# Patient Record
Sex: FEMALE | Race: WHITE | NOT HISPANIC OR LATINO
[De-identification: names, ages, dates, MRNs, and addresses within clinical notes are randomized per-mention and may not be internally consistent; named-entity substitution may affect disease eponyms.]

---

## 2021-05-07 PROBLEM — Z00.00 ENCOUNTER FOR PREVENTIVE HEALTH EXAMINATION: Status: ACTIVE | Noted: 2021-05-07

## 2021-05-11 ENCOUNTER — APPOINTMENT (OUTPATIENT)
Dept: ENDOCRINOLOGY | Facility: CLINIC | Age: 81
End: 2021-05-11
Payer: MEDICARE

## 2021-05-11 VITALS
SYSTOLIC BLOOD PRESSURE: 140 MMHG | BODY MASS INDEX: 33.13 KG/M2 | DIASTOLIC BLOOD PRESSURE: 80 MMHG | OXYGEN SATURATION: 98 % | HEIGHT: 62 IN | HEART RATE: 88 BPM | WEIGHT: 180 LBS

## 2021-05-11 PROCEDURE — 99204 OFFICE O/P NEW MOD 45 MIN: CPT

## 2021-05-11 NOTE — REASON FOR VISIT
[Initial Evaluation] : an initial evaluation [Hypercalcemia] : hypercalcemia [DM Type 2] : DM Type 2 [Hypothyroidism] : hypothyroidism

## 2021-05-11 NOTE — REVIEW OF SYSTEMS
[Recent Weight Loss (___ Lbs)] : recent weight loss: [unfilled] lbs [Dysphonia] : dysphonia [Dry Skin] : dry skin [Hair Loss] : hair loss [Stress] : stress [Polydipsia] : polydipsia [Cold Intolerance] : cold intolerance [Fatigue] : no fatigue [Blurred Vision] : no blurred vision [Dysphagia] : no dysphagia [Neck Pain] : no neck pain [Chest Pain] : no chest pain [Palpitations] : no palpitations [Shortness Of Breath] : no shortness of breath [Nausea] : no nausea [Constipation] : no constipation [Abdominal Pain] : no abdominal pain [Vomiting] : no vomiting [Diarrhea] : no diarrhea [Polyuria] : no polyuria [Muscle Weakness] : no muscle weakness [Muscle Cramps] : no muscle cramps [Tremors] : no tremors [Pain/Numbness of Digits] : no pain/numbness of digits [Depression] : no depression [Anxiety] : no anxiety [FreeTextEntry5] : racing heart after meals [FreeTextEntry7] : IBS

## 2021-05-11 NOTE — CONSULT LETTER
[Dear  ___] : Dear  [unfilled], [Consult Letter:] : I had the pleasure of evaluating your patient, [unfilled]. [Please see my note below.] : Please see my note below. [Consult Closing:] : Thank you very much for allowing me to participate in the care of this patient.  If you have any questions, please do not hesitate to contact me. [Sincerely,] : Sincerely, [FreeTextEntry3] : Nilda Brooke, DARIALISA-BC

## 2021-05-11 NOTE — HISTORY OF PRESENT ILLNESS
[FreeTextEntry1] : Patient presents today for evaluation of thyroid disorder, referred by PCP Dr Fernández.\par Hypothyroidism diagnosed in 1994 when she saw PCP for evaluation of weight gain.\par Originally started on LT4 88 mcg daily and then weaned down to 75 mcg daily years ago.\par \par Has been noticing more hair loss and dry skin over the past year. \par Feels shaky/anxious at times.\par Earlier this year, had fever and chills, but was COVID negative.\par Has not gained weight despite diet changes. Usually gains 5-7 pounds during the holiday season, but lost 5 pounds this year and is only slowly starting to gain weight back now.\par \par Current regimen:\par LT4 75 mcg daily\par Takes appropriately.\par \par Never had thyroid sonogram.\par Denies anterior neck pain. Occasional soreness secondary to post nasal drip.\par Denies dysphagia. \par Some voice hoarseness she contributes to GERD; on protonix for this. \par Denies exposure to radiation of the neck.\par ============================================\par Hypercalcemia\par No family history of high calcium\par No history of osteoporosis. Has been told she has osteopenia in the past.\par No h/o fractures.\par No use of calcium supplements.\par No Tums.\par DIetary calcium: minimal. No milk, no yogurt, no cheese  \par No history of kidney stones.\par =========================================================\par Diabetes\par Type: 2\par Severity: mild\par Duration: April 2021\par Onset: prediabetes progressed to type 2 diabetes\par Associated symptoms: none\par \par Current regimen: no medications.\par \par Modifying factors:\par Diet: has done low carb diet for years, but was eating more bread and pasta this year.\par Weight: lost 5 pounds early this year and now slowly gaining back\par Exercise: none other than house work\par ===========================================================================\par PMH: HTN, hyperlipideima, GERD, IBS. Denies GDM.\par PSH: tubal ligation, cholecystectomy, tonsillectomy\par FH: 3 sisters, 1 daughter, and father all with hypothyroidism. 1 sister with diabetes. Other daughter with thyroid nodules. 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 No recreational drug use.\par \par \par \par \par \par A1c 6.6%.

## 2021-05-11 NOTE — DATA REVIEWED
[FreeTextEntry1] : Labs April 2021\par TSH 5.39\par Free T4 1.4\par Calcium 12.5 \par \par A1c 6.6%\par Cr 1.35\par GFR 37\par \par Labs Feb 2021\par Calcium 11.3\par Cr 1.12\par GFR 47\par \par Labs Dec 2020\par Calcium 10.8\par TSH 6.76\par A1c 6.4%\par Cr 1.11\par GFR 60\par \par Labs June 202\par Calcium 10.6\par A1c 6.0%\par Cr 1.16\par GFR 45\par

## 2021-05-11 NOTE — ASSESSMENT
[Importance of Diet and Exercise] : importance of diet and exercise to improve glycemic control, achieve weight loss and improve cardiovascular health [Levothyroxine] : The patient was instructed to take Levothyroxine on an empty stomach, separate from vitamins, and wait at least 30 minutes before eating [FreeTextEntry1] : 80 year old female with hypothyroidism, hypercalcemia/hyperparathyroidism, and newly diagnosed type 2 diabetes. \par \par 1. Hypothyroidism- mild TSH elevation April 2021, but improving.\par -Continue current dose of LT4.\par -Given age, hypercalcemia, and history of osteopenia, would avoid increasing dose at this time.\par -Repeat TFTs now with Tg ab and TPO ab to see if trend has continued to improve.\par \par 2. Hypercalcemia/hyperparathyroidism\par -Repeat calcium and PTH now with vitamin D level, ionized calcium, and phos.\par -Check thyroid/parathyroid sonogram.\par -Will likely need 24 hour urine calcium and Sestamibi scan.\par -Check DEXA scan.\par \par 3. T2DM- A1c 6.6%.\par -Discussed importance of lifestyle modifications- diet, exercise, and weight loss- to improve glycemic control.\par -Advised follow-up with CDE to review diabetic diet.\par -Repeat A1c in 3 months.\par \par 4. Declining kidney function\par -Repeat Cr and GFR now.\par -Will likely need nephrology referral.\par

## 2021-05-11 NOTE — PHYSICAL EXAM
[Alert] : alert [Well Nourished] : well nourished [No Acute Distress] : no acute distress [Well Developed] : well developed [Normal Sclera/Conjunctiva] : normal sclera/conjunctiva [EOMI] : extra ocular movement intact [No Proptosis] : no proptosis [Thyroid Not Enlarged] : the thyroid was not enlarged [No Thyroid Nodules] : no palpable thyroid nodules [No Respiratory Distress] : no respiratory distress [No Accessory Muscle Use] : no accessory muscle use [Clear to Auscultation] : lungs were clear to auscultation bilaterally [Normal S1, S2] : normal S1 and S2 [Normal Rate] : heart rate was normal [Regular Rhythm] : with a regular rhythm [No Edema] : no peripheral edema [Normal Anterior Cervical Nodes] : no anterior cervical lymphadenopathy [Normal Posterior Cervical Nodes] : no posterior cervical lymphadenopathy [Normal Gait] : normal gait [No Tremors] : no tremors [Oriented x3] : oriented to person, place, and time [Normal Affect] : the affect was normal [Normal Mood] : the mood was normal [Acanthosis Nigricans] : no acanthosis nigricans

## 2021-05-19 ENCOUNTER — APPOINTMENT (OUTPATIENT)
Dept: ENDOCRINOLOGY | Facility: CLINIC | Age: 81
End: 2021-05-19
Payer: MEDICARE

## 2021-05-19 LAB — HBA1C MFR BLD HPLC: 6.6

## 2021-05-19 PROCEDURE — G0108 DIAB MANAGE TRN  PER INDIV: CPT

## 2021-06-15 ENCOUNTER — RX RENEWAL (OUTPATIENT)
Age: 81
End: 2021-06-15

## 2021-06-22 ENCOUNTER — APPOINTMENT (OUTPATIENT)
Dept: ENDOCRINOLOGY | Facility: CLINIC | Age: 81
End: 2021-06-22

## 2021-07-02 ENCOUNTER — APPOINTMENT (OUTPATIENT)
Dept: ENDOCRINOLOGY | Facility: CLINIC | Age: 81
End: 2021-07-02
Payer: MEDICARE

## 2021-07-02 VITALS
BODY MASS INDEX: 32.39 KG/M2 | SYSTOLIC BLOOD PRESSURE: 140 MMHG | HEART RATE: 81 BPM | WEIGHT: 176 LBS | OXYGEN SATURATION: 98 % | DIASTOLIC BLOOD PRESSURE: 80 MMHG | HEIGHT: 62 IN

## 2021-07-02 LAB — GLUCOSE BLDC GLUCOMTR-MCNC: 124

## 2021-07-02 PROCEDURE — 99214 OFFICE O/P EST MOD 30 MIN: CPT | Mod: 25

## 2021-07-02 PROCEDURE — 82962 GLUCOSE BLOOD TEST: CPT

## 2021-07-02 NOTE — HISTORY OF PRESENT ILLNESS
[FreeTextEntry1] : Patient presents today for follow-up of hypothyroidism, hypercalcemia/hyperparathyroidism, and type 2 diabetes.\par \par Hypothyroidism diagnosed in 1994 when she saw PCP for evaluation of weight gain.\par Originally started on LT4 88 mcg daily and then weaned down to 75 mcg daily years ago.\par Increased back to 88 mcg in May 2021 for TSH 6.34 and hair loss and dry skin have been improving.\par Feeling less shaky/anxious than in the past\par \par Current regimen:\par LT4 88 mcg daily\par Takes appropriately.\par \par Thyroid sonogram May 2021: right 0.7 x 0.4 x 0.5 cm cyst\par \par Denies anterior neck pain. Occasional soreness secondary to post nasal drip.\par Denies dysphagia. \par Some voice hoarseness she attributes to GERD; on protonix for this. \par Denies exposure to radiation of the neck.\par ============================================\par Hypercalcemia\par No family history of high calcium\par No history of osteoporosis. Has been told she has osteopenia in the past.\par No h/o fractures.\par No use of calcium supplements.\par No Tums.\par DIetary calcium: minimal. No milk, no yogurt, no cheese \par No history of kidney stones.\par \par Labs:\par June 2020: Calcium 10.6, Cr 1.16, GFR 45\par December 2020: Calcium 10.8, Cr 1.11\par February 2021: Calcium 11.3, Cr 1.12, GFR 47\par April 2021: Calcium 12.5, , GFR 37, Cr 1.35\par May 2021: Calcium 10.9, , ionized calcium 6.0, Cr 1.05, GFR 51, vitamin D 18, Calcitriol 83.8, 24 hr urine 178\par \par Imaging:\par DEXA May 2021: osteopenia, FRAX 4.5% at the hip and 15.4% for major osteoporotic fracture. -12% change in bone density at the hip compared to prior imaging in 2016.\par Sestamibi scan June 2021: left sided sestamibi avid nodule most likely representing a parathyroid adenoma.\par =========================================================\par Diabetes\par Type: 2\par Severity: mild\par Duration: April 2021\par Onset: prediabetes progressed to type 2 diabetes\par Associated symptoms: none\par \par Current regimen: no medications.\par Current , ate lunch one hour ago (turkey, swiss cheese on a low carb wrap)\par \par Modifying factors:\par Diet: has done low carb diet in the past. During pandemic was baking and not eating as healthy. Since seeing CDE has been avoiding bread, cut back on what she was normally eating and now eating low carb again.\par Weight: lost 4 pounds in the past 8 weeks\par Exercise: none other than house work\par \par Eye exam: February 2021, denies DR per patient.\par Foot exam: denies numbness/tingling. Saw podiatry August 2020.\par Heart disease: denies MI/CVA, no stents\par Kidney disease: decreased GFR may be due to hypercalcemia, improving.\par ===========================================================================\par PMH: HTN, hyperlipideima, GERD, IBS. Denies GDM.\par PSH: tubal ligation, cholecystectomy, tonsillectomy\par FH: 3 sisters, 1 daughter, and father all with hypothyroidism. 1 sister with diabetes. Other daughter with thyroid nodules. 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 No recreational drug use.

## 2021-07-02 NOTE — REVIEW OF SYSTEMS
[Recent Weight Loss (___ Lbs)] : recent weight loss: [unfilled] lbs [Heartburn] : heartburn [Muscle Cramps] : muscle cramps [Fatigue] : no fatigue [Blurred Vision] : no blurred vision [Dysphagia] : no dysphagia [Neck Pain] : no neck pain [Dysphonia] : no dysphonia [Chest Pain] : no chest pain [Palpitations] : no palpitations [Shortness Of Breath] : no shortness of breath [Nausea] : no nausea [Abdominal Pain] : no abdominal pain [Constipation] : no constipation [Vomiting] : no vomiting [Diarrhea] : no diarrhea [Polyuria] : no polyuria [Dry Skin] : no dry skin [Hair Loss] : no hair loss [Tremors] : no tremors [Depression] : no depression [Anxiety] : no anxiety [Polydipsia] : no polydipsia

## 2021-07-02 NOTE — PHYSICAL EXAM
[Alert] : alert [Well Nourished] : well nourished [No Acute Distress] : no acute distress [Well Developed] : well developed [Normal Sclera/Conjunctiva] : normal sclera/conjunctiva [EOMI] : extra ocular movement intact [No Proptosis] : no proptosis [Thyroid Not Enlarged] : the thyroid was not enlarged [No Thyroid Nodules] : no palpable thyroid nodules [No Respiratory Distress] : no respiratory distress [No Accessory Muscle Use] : no accessory muscle use [Clear to Auscultation] : lungs were clear to auscultation bilaterally [Normal S1, S2] : normal S1 and S2 [Normal Rate] : heart rate was normal [Regular Rhythm] : with a regular rhythm [No Edema] : no peripheral edema [Normal Anterior Cervical Nodes] : no anterior cervical lymphadenopathy [Normal Gait] : normal gait [No Tremors] : no tremors [Oriented x3] : oriented to person, place, and time [Normal Affect] : the affect was normal [Normal Mood] : the mood was normal [Acanthosis Nigricans] : no acanthosis nigricans

## 2021-07-02 NOTE — ASSESSMENT
[FreeTextEntry1] : 80 year old female with hypothyroidism, hypercalcemia/hyperparathyroidism, and newly diagnosed type 2 diabetes. \par \par 1. Hypothyroidism- euthyroid on replacement T4.\par -Continue current dose of LT4.\par -Repeat TFTs with next labs.\par \par 2. Hypercalcemia due to primary hyperparathyroidism\par -Given degree of hypercalcemia in the past, decreased kidney function, and patient's good state of health, recommend evaluation by ENT surgery.\par -Reviewed risks of hyperparathyroidism including bone loss, fracture, kidney stones, etc.\par \par 3. T2DM- A1c 6.6%.\par -Discussed importance of lifestyle modifications- diet, exercise, and weight loss- to improve glycemic control.\par -Repeat A1c in 3 months.\par \par

## 2021-07-02 NOTE — REASON FOR VISIT
[Follow - Up] : a follow-up visit [Hypercalcemia] : hypercalcemia [DM Type 2] : DM Type 2 [Hypothyroidism] : hypothyroidism

## 2021-11-11 LAB
HBA1C MFR BLD HPLC: 6
LDLC SERPL DIRECT ASSAY-MCNC: 89

## 2021-11-12 ENCOUNTER — APPOINTMENT (OUTPATIENT)
Dept: ENDOCRINOLOGY | Facility: CLINIC | Age: 81
End: 2021-11-12
Payer: MEDICARE

## 2021-11-12 VITALS
WEIGHT: 170 LBS | OXYGEN SATURATION: 98 % | BODY MASS INDEX: 31.28 KG/M2 | DIASTOLIC BLOOD PRESSURE: 76 MMHG | HEIGHT: 62 IN | SYSTOLIC BLOOD PRESSURE: 124 MMHG | HEART RATE: 92 BPM

## 2021-11-12 DIAGNOSIS — E83.52 HYPERCALCEMIA: ICD-10-CM

## 2021-11-12 DIAGNOSIS — E55.9 VITAMIN D DEFICIENCY, UNSPECIFIED: ICD-10-CM

## 2021-11-12 LAB — GLUCOSE BLDC GLUCOMTR-MCNC: 108

## 2021-11-12 PROCEDURE — 99214 OFFICE O/P EST MOD 30 MIN: CPT | Mod: 25

## 2021-11-12 PROCEDURE — 82962 GLUCOSE BLOOD TEST: CPT

## 2021-11-12 RX ORDER — MULTIVIT-MIN/FOLIC/VIT K/LYCOP 400-300MCG
25 MCG TABLET ORAL DAILY
Qty: 90 | Refills: 0 | Status: DISCONTINUED | COMMUNITY
Start: 2021-05-18 | End: 2021-11-12

## 2021-11-12 RX ORDER — PANTOPRAZOLE SODIUM 40 MG/1
40 TABLET, DELAYED RELEASE ORAL
Refills: 0 | Status: ACTIVE | COMMUNITY

## 2021-11-12 RX ORDER — LOVASTATIN 40 MG/1
40 TABLET ORAL
Refills: 0 | Status: ACTIVE | COMMUNITY

## 2021-11-12 RX ORDER — AMLODIPINE BESYLATE 5 MG/1
5 TABLET ORAL
Refills: 0 | Status: ACTIVE | COMMUNITY

## 2021-11-12 NOTE — REVIEW OF SYSTEMS
[Recent Weight Loss (___ Lbs)] : recent weight loss: [unfilled] lbs [Nocturia] : nocturia [Blurred Vision] : no blurred vision [Dysphagia] : no dysphagia [Neck Pain] : no neck pain [Chest Pain] : no chest pain [Palpitations] : no palpitations [Shortness Of Breath] : no shortness of breath [Nausea] : no nausea [Constipation] : no constipation [Abdominal Pain] : no abdominal pain [Vomiting] : no vomiting [Diarrhea] : no diarrhea [Tremors] : no tremors [Pain/Numbness of Digits] : no pain/numbness of digits

## 2021-11-12 NOTE — ASSESSMENT
[FreeTextEntry1] : 81 year old female with hypothyroidism, hypercalcemia/hyperparathyroidism, and newly diagnosed type 2 diabetes. \par \par 1. Hypothyroidism- euthyroid on replacement T4.\par -Continue current dose of LT4.\par -Repeat TFTs in 3 months.\par \par 2. Hypercalcemia due to primary hyperparathyroidism\par -Given degree of hypercalcemia in the past, decreased kidney function, and patient's good state of health, agree with plan for surgery.\par -Reviewed risks of hyperparathyroidism including bone loss, fracture, kidney stones, etc.\par -Repeat calcium and PTH after surgery.\par \par 3. T2DM- A1c 6.0%.\par -Discussed importance of lifestyle modifications- diet, exercise, and weight loss- to maintain glycemic control.\par -Repeat A1c in 3 months.\par \par 4. Vitamin D deficiency\par -Can hold off on supplement for now given mild deficiency and elevated calcium.

## 2021-11-12 NOTE — REASON FOR VISIT
[Follow - Up] : a follow-up visit [Hypercalcemia] : hypercalcemia [DM Type 2] : DM Type 2 [Hypothyroidism] : hypothyroidism [Hyperparathyroidism] : hyperparathyroidism

## 2021-11-12 NOTE — HISTORY OF PRESENT ILLNESS
[FreeTextEntry1] : Had UTI last month. Presents today for follow-up of hypothyroidism, hypercalcemia/hyperparathyroidism, and type 2 diabetes.\par \par Hypothyroidism diagnosed in 1994 when she saw PCP for evaluation of weight gain.\par Originally started on LT4 88 mcg daily and then weaned down to 75 mcg daily years ago.\par Increased back to 88 mcg in May 2021 for TSH 6.34 and hair loss and dry skin have been improving.\par Feeling less shaky/anxious than in the past\par \par Current regimen:\par LT4 88 mcg daily\par Takes appropriately.\par \par Thyroid sonogram May 2021: right 0.7 x 0.4 x 0.5 cm cyst\par \par Denies anterior neck pain.\par Denies dysphagia. \par Some voice hoarseness she attributes to GERD; on Protonix for this. \par Denies exposure to radiation of the neck.\par ============================================\par Hypercalcemia\par No family history of high calcium\par No history of osteoporosis. Has been told she has osteopenia in the past.\par No h/o fractures.\par No use of calcium supplements.\par No Tums.\par DIetary calcium: minimal. No milk, no yogurt, no cheese \par No history of kidney stones.\par \par Labs:\par June 2020: Calcium 10.6, Cr 1.16, GFR 45\par December 2020: Calcium 10.8, Cr 1.11\par February 2021: Calcium 11.3, Cr 1.12, GFR 47\par April 2021: Calcium 12.5, , GFR 37, Cr 1.35\par May 2021: Calcium 10.9, , ionized calcium 6.0, Cr 1.05, GFR 51, vitamin D 18, Calcitriol 83.8, 24 hr urine 178\par October 2021: Calcium 11.2, .2, Cr 1.05, GFR 50, vitamin D 26.6 \par \par Imaging:\par DEXA May 2021: osteopenia, FRAX 4.5% at the hip and 15.4% for major osteoporotic fracture. -12% change in bone density at the hip compared to prior imaging in 2016.\par Sestamibi scan June 2021: left sided sestamibi avid nodule most likely representing a parathyroid adenoma.\par \par Saw ENT, Dr Lipscomb, and is planning on surgery after the holidays.\par =========================================================\par Diabetes\par Type: 2\par Severity: mild\par Duration: April 2021\par Onset: prediabetes progressed to type 2 diabetes\par Associated symptoms: none\par \par Current regimen: no medications.\par Current , ate turkey, swiss cheese, and tomato on high fiber bread 1 hour ago\par \par Modifying factors:\par Diet: has done low carb diet in the past. During pandemic was baking and not eating as healthy. Since seeing CDE has been avoiding bread, cut back on what she was normally eating and now eating low carb again.\par Weight: lost 6 pounds in 3 months\par Exercise: none other than house work\par \par Eye exam: September 2021, denies DR per patient.\par Foot exam: denies numbness/tingling. Saw podiatry one year ago.\par Heart disease: denies MI/CVA, no stents\par Kidney disease: decreased GFR may be due to hypercalcemia, improving.\par ===========================================================================\par PMH: HTN, hyperlipidemia, GERD, IBS. Denies GDM.\par PSH: tubal ligation, cholecystectomy, tonsillectomy\par FH: 3 sisters, 1 daughter, and father all with hypothyroidism. 1 sister with diabetes. Other daughter with thyroid nodules. 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 No recreational drug use. \par

## 2022-03-28 LAB
HBA1C MFR BLD HPLC: 6.3
LDLC SERPL DIRECT ASSAY-MCNC: 113

## 2022-03-29 ENCOUNTER — APPOINTMENT (OUTPATIENT)
Dept: ENDOCRINOLOGY | Facility: CLINIC | Age: 82
End: 2022-03-29
Payer: MEDICARE

## 2022-03-29 VITALS
WEIGHT: 172 LBS | BODY MASS INDEX: 31.65 KG/M2 | DIASTOLIC BLOOD PRESSURE: 80 MMHG | HEART RATE: 83 BPM | HEIGHT: 62 IN | SYSTOLIC BLOOD PRESSURE: 130 MMHG | OXYGEN SATURATION: 97 %

## 2022-03-29 DIAGNOSIS — R73.03 PREDIABETES.: ICD-10-CM

## 2022-03-29 DIAGNOSIS — Z78.9 OTHER SPECIFIED HEALTH STATUS: ICD-10-CM

## 2022-03-29 DIAGNOSIS — E11.9 TYPE 2 DIABETES MELLITUS W/OUT COMPLICATIONS: ICD-10-CM

## 2022-03-29 DIAGNOSIS — E21.3 HYPERPARATHYROIDISM, UNSPECIFIED: ICD-10-CM

## 2022-03-29 DIAGNOSIS — E03.9 HYPOTHYROIDISM, UNSPECIFIED: ICD-10-CM

## 2022-03-29 DIAGNOSIS — Z83.49 FAMILY HISTORY OF OTHER ENDOCRINE, NUTRITIONAL AND METABOLIC DISEASES: ICD-10-CM

## 2022-03-29 LAB — GLUCOSE BLDC GLUCOMTR-MCNC: 98

## 2022-03-29 PROCEDURE — 99214 OFFICE O/P EST MOD 30 MIN: CPT | Mod: 25

## 2022-03-29 PROCEDURE — 82962 GLUCOSE BLOOD TEST: CPT

## 2022-03-29 NOTE — PHYSICAL EXAM
[Healthy Appearance] : healthy appearance [No Acute Distress] : no acute distress [No Neck Mass] : no neck mass was observed [No LAD] : no lymphadenopathy [Supple] : the neck was supple [Thyroid Not Enlarged] : the thyroid was not enlarged [No Thyroid Nodules] : no palpable thyroid nodules [No Respiratory Distress] : no respiratory distress [Clear to Auscultation] : lungs were clear to auscultation bilaterally [Normal S1, S2] : normal S1 and S2 [No Murmurs] : no murmurs [Normal Rate] : heart rate was normal [Regular Rhythm] : with a regular rhythm [Normal Gait] : normal gait [No Clubbing, Cyanosis] : no clubbing  or cyanosis of the fingernails [Normal Affect] : the affect was normal [Normal Insight/Judgement] : insight and judgment were intact [Normal Mood] : the mood was normal [No Spinal Tenderness] : no spinal tenderness [Spine Straight] : spine straight [Acanthosis Nigricans] : no acanthosis nigricans

## 2022-03-29 NOTE — DATA REVIEWED
[FreeTextEntry1] : LABS:\par 2/8/2022:\par creatinine 1.13\par Calcium 10.8\par PTH 98\par 25(OH)D 21\par \par A1c 6.3\par \par DXA\par 5/26/2021:\par Distal forearm  T score -1.2\par L spine T score  -1.6\par Left femoral neck T score -1.8

## 2022-03-29 NOTE — ASSESSMENT
[FreeTextEntry1] : 81 year old female with PMH of mild Type 2 DM, currently well controlled with diet modification, hypothyroidism HTN, HLD and PHPT.  Her hypercalcemia is stable. \par \par 1.  PHPT-  explained that she meets criteria for parathyroidectomy due to her reduced renal function and her level of hypercalcemia > 11.5 within the past year.   Discussed complications of untreated PHPT including bone loss, further decline in renal function and nephrolithiasis.  Ok to postpone surgery until summer 2022 as her hypercalcemia is stable at the current time.   After surgery, will need calcium and vitamin D supplementation due to associated low bone mass.\par 2.  Hypothyroidism-  continue current dose of LT4.\par 3.  Type 2 DM-  continue lifestyle modification.\par \par Will arrange for follow up in September 2022 as she is planning on having parathyroidectomy in summer 2022.

## 2022-03-29 NOTE — REVIEW OF SYSTEMS
[Fatigue] : no fatigue [Constipation] : no constipation [Polyuria] : no polyuria [Polydipsia] : no polydipsia

## 2022-03-29 NOTE — HISTORY OF PRESENT ILLNESS
[FreeTextEntry1] : Here for follow up of primary hyperparathyroidism, hypothyroidism and mild type 2 DM.\par \par History of moderate hypercalcemia since 2020 with level as high as 12.5 in 2021.  Labs confirmed PHPT with elevated PTH and urine calcium of 178.  DXA showed associated osteopenia and mildly reduced GFR.    Sestamibi shows uptake in left upper thyroid bed.  Thyroid US only revealed 0.7 cm cyst in the right lobe.  Following with Dr. Lipscomb, but patient wants to postpone surgery until summer 2022.  \par \par Long standing hypothyroidism since 1994.  Currently takes LT4 88 mcg daily. \par \par Mild T2DM was diagnosed on routine labs in April 2021, improved with diet modification.  \par \par Denies any associated fatigue or polyuria.

## 2022-09-20 ENCOUNTER — APPOINTMENT (OUTPATIENT)
Dept: ENDOCRINOLOGY | Facility: CLINIC | Age: 82
End: 2022-09-20

## 2023-06-28 ENCOUNTER — RX RENEWAL (OUTPATIENT)
Age: 83
End: 2023-06-28

## 2023-11-17 ENCOUNTER — OFFICE (OUTPATIENT)
Dept: URBAN - METROPOLITAN AREA CLINIC 12 | Facility: CLINIC | Age: 83
Setting detail: OPHTHALMOLOGY
End: 2023-11-17
Payer: MEDICARE

## 2023-11-17 DIAGNOSIS — H40.013: ICD-10-CM

## 2023-11-17 DIAGNOSIS — H43.813: ICD-10-CM

## 2023-11-17 DIAGNOSIS — E11.9: ICD-10-CM

## 2023-11-17 DIAGNOSIS — H25.13: ICD-10-CM

## 2023-11-17 PROCEDURE — 92250 FUNDUS PHOTOGRAPHY W/I&R: CPT | Performed by: OPHTHALMOLOGY

## 2023-11-17 PROCEDURE — 99204 OFFICE O/P NEW MOD 45 MIN: CPT | Performed by: OPHTHALMOLOGY

## 2023-11-17 ASSESSMENT — REFRACTION_AUTOREFRACTION
OD_AXIS: 177
OD_CYLINDER: -0.75
OD_SPHERE: +2.25
OS_CYLINDER: -0.50
OS_AXIS: 005
OS_SPHERE: +3.00

## 2023-11-17 ASSESSMENT — REFRACTION_CURRENTRX
OS_SPHERE: +2.50
OD_OVR_VA: 20/
OD_ADD: +2.75
OS_VPRISM_DIRECTION: SV
OD_VPRISM_DIRECTION: SV
OS_CYLINDER: SPHERE
OS_SPHERE: +2.00
OD_CYLINDER: SPHERE
OD_VPRISM_DIRECTION: BF
OS_CYLINDER: -0.75
OS_OVR_VA: 20/
OD_AXIS: 007
OD_CYLINDER: -0.75
OD_SPHERE: +2.25
OD_OVR_VA: 20/
OS_AXIS: 023
OS_VPRISM_DIRECTION: BF
OD_SPHERE: +2.00
OS_ADD: +2.75
OS_OVR_VA: 20/

## 2023-11-17 ASSESSMENT — REFRACTION_MANIFEST
OD_SPHERE: +2.25
OS_SPHERE: +3.00
OD_VA1: 20/25+2
OD_AXIS: 175
OD_CYLINDER: -0.75
OS_CYLINDER: -0.50
OS_AXIS: 005
OS_VA1: 20/30

## 2023-11-17 ASSESSMENT — SPHEQUIV_DERIVED
OS_SPHEQUIV: 2.75
OS_SPHEQUIV: 2.75
OD_SPHEQUIV: 1.875
OD_SPHEQUIV: 1.875

## 2023-11-17 ASSESSMENT — CONFRONTATIONAL VISUAL FIELD TEST (CVF)
OS_FINDINGS: FULL
OD_FINDINGS: FULL

## 2024-06-11 ASSESSMENT — KERATOMETRY
OS_K2POWER_DIOPTERS: 43.75
OD_AXISANGLE_DEGREES: 085
OD_K1POWER_DIOPTERS: 43.25
OS_AXISANGLE_DEGREES: 095
OD_K2POWER_DIOPTERS: 44.25
OS_K1POWER_DIOPTERS: 43.00

## 2024-06-11 ASSESSMENT — REFRACTION_CURRENTRX
OD_SPHERE: +2.00
OS_VPRISM_DIRECTION: BF
OS_ADD: +2.75
OS_CYLINDER: SPHERE
OS_OVR_VA: 20/
OD_CYLINDER: -0.75
OS_CYLINDER: -0.75
OS_SPHERE: +2.00
OD_VPRISM_DIRECTION: BF
OS_OVR_VA: 20/
OS_VPRISM_DIRECTION: SV
OS_SPHERE: +2.50
OD_ADD: +2.75
OD_SPHERE: +2.25
OD_AXIS: 007
OD_VPRISM_DIRECTION: SV
OD_OVR_VA: 20/
OD_OVR_VA: 20/
OD_CYLINDER: SPHERE
OS_AXIS: 023

## 2024-06-11 ASSESSMENT — REFRACTION_MANIFEST
OS_SPHERE: +3.00
OD_AXIS: 175
OS_AXIS: 005
OS_CYLINDER: -0.50
OS_VA1: 20/30
OD_CYLINDER: -0.75
OD_VA1: 20/25+2
OD_SPHERE: +2.25

## 2024-06-19 ENCOUNTER — RX RENEWAL (OUTPATIENT)
Age: 84
End: 2024-06-19

## 2024-06-19 RX ORDER — LEVOTHYROXINE SODIUM 0.09 MG/1
88 TABLET ORAL
Qty: 90 | Refills: 0 | Status: ACTIVE | COMMUNITY
Start: 2021-05-18 | End: 1900-01-01

## 2024-07-10 ENCOUNTER — OFFICE (OUTPATIENT)
Dept: URBAN - METROPOLITAN AREA CLINIC 105 | Facility: CLINIC | Age: 84
Setting detail: OPHTHALMOLOGY
End: 2024-07-10
Payer: MEDICARE

## 2024-07-10 ENCOUNTER — RX ONLY (RX ONLY)
Age: 84
End: 2024-07-10

## 2024-07-10 DIAGNOSIS — H25.12: ICD-10-CM

## 2024-07-10 DIAGNOSIS — H25.13: ICD-10-CM

## 2024-07-10 PROBLEM — H25.11 CATARACT; RIGHT EYE, LEFT EYE, BOTH EYES: Status: ACTIVE | Noted: 2024-07-10

## 2024-07-10 PROBLEM — H52.7 REFRACTIVE ERROR: Status: ACTIVE | Noted: 2024-07-10

## 2024-07-10 PROCEDURE — 92136 OPHTHALMIC BIOMETRY: CPT | Mod: 26,LT | Performed by: OPHTHALMOLOGY

## 2024-07-10 PROCEDURE — 92136 OPHTHALMIC BIOMETRY: CPT | Mod: TC | Performed by: OPHTHALMOLOGY

## 2024-07-10 PROCEDURE — 99213 OFFICE O/P EST LOW 20 MIN: CPT | Performed by: OPHTHALMOLOGY

## 2024-07-10 ASSESSMENT — CONFRONTATIONAL VISUAL FIELD TEST (CVF)
OS_FINDINGS: FULL
OD_FINDINGS: FULL

## 2024-09-05 ENCOUNTER — AMBULATORY SURGERY CENTER (OUTPATIENT)
Dept: URBAN - METROPOLITAN AREA SURGERY 4 | Facility: SURGERY | Age: 84
Setting detail: OPHTHALMOLOGY
End: 2024-09-05
Payer: MEDICARE

## 2024-09-05 ENCOUNTER — RX ONLY (RX ONLY)
Age: 84
End: 2024-09-05

## 2024-09-05 DIAGNOSIS — H25.12: ICD-10-CM

## 2024-09-05 DIAGNOSIS — H52.4: ICD-10-CM

## 2024-09-05 PROCEDURE — 66984 XCAPSL CTRC RMVL W/O ECP: CPT | Mod: 54,LT | Performed by: OPHTHALMOLOGY

## 2024-09-05 PROCEDURE — V2788P PANOPTIX: Performed by: OPHTHALMOLOGY

## 2024-09-06 ENCOUNTER — OFFICE (OUTPATIENT)
Dept: URBAN - METROPOLITAN AREA CLINIC 105 | Facility: CLINIC | Age: 84
Setting detail: OPHTHALMOLOGY
End: 2024-09-06
Payer: MEDICARE

## 2024-09-06 DIAGNOSIS — H25.11: ICD-10-CM

## 2024-09-06 PROCEDURE — 92136 OPHTHALMIC BIOMETRY: CPT | Mod: 26,RT | Performed by: OPHTHALMOLOGY

## 2024-09-06 ASSESSMENT — CONFRONTATIONAL VISUAL FIELD TEST (CVF)
OD_FINDINGS: FULL
OS_FINDINGS: FULL

## 2024-09-23 ENCOUNTER — AMBULATORY SURGERY CENTER (OUTPATIENT)
Dept: URBAN - METROPOLITAN AREA SURGERY 4 | Facility: SURGERY | Age: 84
Setting detail: OPHTHALMOLOGY
End: 2024-09-23
Payer: MEDICARE

## 2024-09-23 DIAGNOSIS — H52.211: ICD-10-CM

## 2024-09-23 DIAGNOSIS — H25.11: ICD-10-CM

## 2024-09-23 PROCEDURE — V2788P PANOPTIX: Performed by: OPHTHALMOLOGY

## 2024-09-23 PROCEDURE — 66984 XCAPSL CTRC RMVL W/O ECP: CPT | Mod: 54,79,RT | Performed by: OPHTHALMOLOGY

## 2024-09-24 ENCOUNTER — RX ONLY (RX ONLY)
Age: 84
End: 2024-09-24

## 2024-09-24 ENCOUNTER — OFFICE (OUTPATIENT)
Dept: URBAN - METROPOLITAN AREA CLINIC 103 | Facility: CLINIC | Age: 84
Setting detail: OPHTHALMOLOGY
End: 2024-09-24
Payer: MEDICARE

## 2024-09-24 DIAGNOSIS — Z96.1: ICD-10-CM

## 2024-09-24 PROBLEM — H25.11 CATARACT NUCLEAR SCLEROSIS AGE RELATED; RIGHT EYE: Status: RESOLVED | Noted: 2024-09-06 | Resolved: 2024-09-24

## 2024-09-24 PROCEDURE — 99024 POSTOP FOLLOW-UP VISIT: CPT | Performed by: OPHTHALMOLOGY

## 2024-09-24 ASSESSMENT — CONFRONTATIONAL VISUAL FIELD TEST (CVF)
OS_FINDINGS: FULL
OD_FINDINGS: FULL

## 2025-04-14 NOTE — REASON FOR VISIT
No [Follow - Up] : a follow-up visit [DM Type 2] : DM Type 2 [Hypothyroidism] : hypothyroidism [Hyperparathyroidism] : hyperparathyroidism

## 2025-08-04 ENCOUNTER — OFFICE (OUTPATIENT)
Dept: URBAN - METROPOLITAN AREA CLINIC 105 | Facility: CLINIC | Age: 85
Setting detail: OPHTHALMOLOGY
End: 2025-08-04
Payer: MEDICARE

## 2025-08-04 DIAGNOSIS — H26.493: ICD-10-CM

## 2025-08-04 PROCEDURE — 99213 OFFICE O/P EST LOW 20 MIN: CPT | Performed by: OPHTHALMOLOGY

## 2025-08-04 ASSESSMENT — REFRACTION_MANIFEST
OD_CYLINDER: -0.50
OS_VA3: 20/30
OD_VA1: 20/40+
OS_VA1: 20/40+
OS_AXIS: 150
OS_SPHERE: +2.75
OD_VA3: 20/50
OD_SPHERE: +2.25
OS_CYLINDER: -0.50
OD_AXIS: 170

## 2025-08-04 ASSESSMENT — KERATOMETRY
OS_K2POWER_DIOPTERS: 43.75
OD_K2POWER_DIOPTERS: 44.25
OD_AXISANGLE_DEGREES: 086
OD_K1POWER_DIOPTERS: 43.25
OS_K1POWER_DIOPTERS: 43.25
OS_AXISANGLE_DEGREES: 101

## 2025-08-04 ASSESSMENT — REFRACTION_AUTOREFRACTION
OS_CYLINDER: -1.00
OD_SPHERE: +1.25
OD_AXIS: 049
OS_SPHERE: +0.50
OD_CYLINDER: -1.25
OS_AXIS: 101

## 2025-08-04 ASSESSMENT — REFRACTION_CURRENTRX
OD_ADD: +2.75
OS_SPHERE: +2.50
OD_VPRISM_DIRECTION: SV
OS_ADD: +2.75
OD_VPRISM_DIRECTION: BF
OS_AXIS: 023
OS_VPRISM_DIRECTION: SV
OD_OVR_VA: 20/
OS_VPRISM_DIRECTION: BF
OS_SPHERE: +2.00
OS_OVR_VA: 20/
OS_CYLINDER: -0.75
OS_OVR_VA: 20/
OD_SPHERE: +2.00
OD_CYLINDER: -0.75
OD_AXIS: 007
OD_OVR_VA: 20/
OD_SPHERE: +2.25

## 2025-08-04 ASSESSMENT — TONOMETRY
OS_IOP_MMHG: 17
OD_IOP_MMHG: 19

## 2025-08-04 ASSESSMENT — CONFRONTATIONAL VISUAL FIELD TEST (CVF)
OS_FINDINGS: FULL
OD_FINDINGS: FULL

## 2025-08-04 ASSESSMENT — VISUAL ACUITY
OD_BCVA: 20/25
OS_BCVA: 20/50+2